# Patient Record
Sex: FEMALE
[De-identification: names, ages, dates, MRNs, and addresses within clinical notes are randomized per-mention and may not be internally consistent; named-entity substitution may affect disease eponyms.]

---

## 2021-04-14 DIAGNOSIS — Z23 ENCOUNTER FOR IMMUNIZATION: ICD-10-CM

## 2024-06-13 ENCOUNTER — CARE COORDINATION (OUTPATIENT)
Dept: OTHER | Facility: CLINIC | Age: 69
End: 2024-06-13

## 2024-06-13 RX ORDER — ASPIRIN 81 MG/1
81 TABLET, CHEWABLE ORAL DAILY
COMMUNITY
Start: 2024-02-26 | End: 2025-02-25

## 2024-06-13 RX ORDER — METOPROLOL SUCCINATE 25 MG/1
25 TABLET, EXTENDED RELEASE ORAL 2 TIMES DAILY
COMMUNITY
Start: 2016-06-19

## 2024-06-13 RX ORDER — FOLIC ACID 1 MG/1
1 TABLET ORAL DAILY
COMMUNITY
Start: 2021-02-22

## 2024-06-13 RX ORDER — ERGOCALCIFEROL 1.25 MG/1
50000 CAPSULE ORAL DAILY
COMMUNITY

## 2024-06-13 RX ORDER — ACETAMINOPHEN 160 MG
1 TABLET,DISINTEGRATING ORAL DAILY
COMMUNITY

## 2024-06-13 RX ORDER — CETIRIZINE HYDROCHLORIDE 5 MG/1
1 TABLET ORAL DAILY
COMMUNITY
Start: 2024-02-23

## 2024-06-13 RX ORDER — CLOPIDOGREL BISULFATE 75 MG/1
75 TABLET ORAL DAILY
COMMUNITY
Start: 2024-02-26 | End: 2025-02-25

## 2024-06-13 RX ORDER — FLUOXETINE HYDROCHLORIDE 40 MG/1
1 CAPSULE ORAL DAILY
COMMUNITY
Start: 2017-01-03

## 2024-06-13 RX ORDER — VITAMIN B COMPLEX
1 CAPSULE ORAL DAILY
COMMUNITY

## 2024-06-13 RX ORDER — DOXYCYCLINE HYCLATE 100 MG/1
100 CAPSULE ORAL EVERY 12 HOURS
COMMUNITY
Start: 2024-06-09 | End: 2024-06-16

## 2024-06-13 RX ORDER — CHLORDIAZEPOXIDE HYDROCHLORIDE 10 MG/1
10 CAPSULE, GELATIN COATED ORAL 3 TIMES DAILY PRN
COMMUNITY
Start: 2024-01-19

## 2024-06-13 RX ORDER — CYCLOBENZAPRINE HCL 10 MG
1 TABLET ORAL 3 TIMES DAILY PRN
COMMUNITY
Start: 2024-02-23

## 2024-06-13 NOTE — CARE COORDINATION
Patient  Readiness: Acceptance  Method: Explanation  Response: Verbalizes Understanding    Review Discharge Plan, taught by Fide Marmolejo RN at 6/13/2024  9:28 AM.  Learner: Patient  Readiness: Acceptance  Method: Explanation  Response: Verbalizes Understanding    Fall Prevention, taught by Fide Marmolejo RN at 6/13/2024  9:28 AM.  Learner: Patient  Readiness: Acceptance  Method: Explanation  Response: Verbalizes Understanding    Educate safety precautions, taught by Fide Marmolejo RN at 6/13/2024  9:28 AM.  Learner: Patient  Readiness: Acceptance  Method: Explanation  Response: Verbalizes Understanding    Educate home safety, taught by Fide Marmolejo RN at 6/13/2024  9:28 AM.  Learner: Patient  Readiness: Acceptance  Method: Explanation  Response: Verbalizes Understanding    Educate ambulation safety, taught by Fide Marmolejo RN at 6/13/2024  9:28 AM.  Learner: Patient  Readiness: Acceptance  Method: Explanation  Response: Verbalizes Understanding    Education Comments  No comments found.     ,    Goals Addressed                   This Visit's Progress     Conditions and Symptoms        I will schedule office visits, as directed by my provider.  I will keep my appointment or reschedule if I have to cancel.  I will notify my provider of any barriers to my plan of care.  I will notify my provider of any symptoms that indicate a worsening of my condition.    Barriers: stress  Plan for overcoming my barriers: work with ACM, providers  Confidence: 9/10  Anticipated Goal Completion Date: 7/13/24         Reduce Falls         I will reduce my risk of falls by the following:  use soft cast, wear appropriate footwear    Barriers: stress  Plan for overcoming my barriers: work with ACM, orthopedic, PCP  Confidence: 10/10  Anticipated Goal Completion Date: 7/13/24               PCP/Specialist follow up:   Follow-up Information       Follow up With Specialties Details Why Contact Info    [facility] [place]  Go on

## 2024-06-20 ENCOUNTER — CARE COORDINATION (OUTPATIENT)
Dept: OTHER | Facility: CLINIC | Age: 69
End: 2024-06-20

## 2024-06-20 RX ORDER — ACETAMINOPHEN 500 MG
500 TABLET ORAL EVERY 6 HOURS PRN
COMMUNITY

## 2024-06-20 NOTE — CARE COORDINATION
Ambulatory Care Coordination Note     2024 1:45 PM     Patient Current Location:  [place]     ACM contacted the patient by telephone. Verified name and  with patient as identifiers.         ACM: HAFSA JUNIOR RN     Challenges to be reviewed by the provider   Additional needs identified to be addressed with provider No  none               Method of communication with provider: none.    Care Summary Note: ACM follow up call to patient. Patient states that she went to ortho appointment, patient has a hairline fracture above her left ankle and was placed in a walking boot. Patient states she will need boot for 3-6 weeks and then an ankle brace. Patient has some discomfort from the boot, states she has had to adjust her stride. Denies any further falls. Patient states that swelling has gone down and she takes Tylenol for discomfort. ACM discussed elevation and ice if needed. Patient is calling today to schedule follow up appointment in 3 weeks.     Offered patient enrollment in the Remote Patient Monitoring (RPM) program for in-home monitoring: Patient is not eligible for RPM program because: affiliate provider.     Assessments Completed:   Care Coordination Interventions    Referral from Primary Care Provider: No  Suggested Interventions and Community Resources  Fall Risk Prevention: In Process  Other Services: Completed (Comment: 24 ortho follow up)       and   General Assessment    Do you have any symptoms that are causing concern?: Yes  Progression since Onset: Gradually Improving  Reported Symptoms: Other, Pain (Comment: has discomfort and swelling in left ankle due to fracture)          Medications Reviewed:   Completed during a previous call  and patient takes Tylenol prn for pain    Advance Care Planning:   Not on file; education provided, Health Care Decision maker updated, and   Primary Decision Maker: Ale Benitez - Child - [phone] ACM advised to take a copy of ACP to next appointment

## 2024-06-27 ENCOUNTER — CARE COORDINATION (OUTPATIENT)
Dept: OTHER | Facility: CLINIC | Age: 69
End: 2024-06-27

## 2024-06-27 NOTE — CARE COORDINATION
Ambulatory Care Coordination Note     6/27/2024 9:22 AM     Patient outreach attempt by this ACM today to perform care management follow up . ACM was unable to reach the patient by telephone today; left voice message requesting a return phone call to this ACM.     ACM: HAFSA JUNIOR RN         PCP/Specialist follow up:       Follow Up:   Plan for next AC outreach in approximately 2 weeks to complete:  - disease specific assessments  - SDOH assessments  - follow-up appointment with ortho .

## 2024-07-10 ENCOUNTER — CARE COORDINATION (OUTPATIENT)
Dept: OTHER | Facility: CLINIC | Age: 69
End: 2024-07-10

## 2024-07-10 RX ORDER — IBUPROFEN 200 MG
400 TABLET ORAL EVERY 6 HOURS PRN
COMMUNITY

## 2024-07-10 SDOH — HEALTH STABILITY: MENTAL HEALTH: HOW OFTEN DO YOU HAVE A DRINK CONTAINING ALCOHOL?: 2-3 TIMES A WEEK

## 2024-07-10 SDOH — HEALTH STABILITY: MENTAL HEALTH
STRESS IS WHEN SOMEONE FEELS TENSE, NERVOUS, ANXIOUS, OR CAN'T SLEEP AT NIGHT BECAUSE THEIR MIND IS TROUBLED. HOW STRESSED ARE YOU?: RATHER MUCH

## 2024-07-10 SDOH — SOCIAL STABILITY: SOCIAL NETWORK: ARE YOU MARRIED, WIDOWED, DIVORCED, SEPARATED, NEVER MARRIED, OR LIVING WITH A PARTNER?: WIDOWED

## 2024-07-10 SDOH — SOCIAL STABILITY: SOCIAL INSECURITY
WITHIN THE LAST YEAR, HAVE YOU BEEN KICKED, HIT, SLAPPED, OR OTHERWISE PHYSICALLY HURT BY YOUR PARTNER OR EX-PARTNER?: NO

## 2024-07-10 SDOH — SOCIAL STABILITY: SOCIAL INSECURITY: WITHIN THE LAST YEAR, HAVE YOU BEEN HUMILIATED OR EMOTIONALLY ABUSED IN OTHER WAYS BY YOUR PARTNER OR EX-PARTNER?: NO

## 2024-07-10 SDOH — SOCIAL STABILITY: SOCIAL INSECURITY: WITHIN THE LAST YEAR, HAVE YOU BEEN AFRAID OF YOUR PARTNER OR EX-PARTNER?: NO

## 2024-07-10 SDOH — SOCIAL STABILITY: SOCIAL NETWORK: HOW OFTEN DO YOU ATTEND CHURCH OR RELIGIOUS SERVICES?: NEVER

## 2024-07-10 SDOH — SOCIAL STABILITY: SOCIAL NETWORK
DO YOU BELONG TO ANY CLUBS OR ORGANIZATIONS SUCH AS CHURCH GROUPS UNIONS, FRATERNAL OR ATHLETIC GROUPS, OR SCHOOL GROUPS?: YES

## 2024-07-10 SDOH — ECONOMIC STABILITY: FOOD INSECURITY: WITHIN THE PAST 12 MONTHS, YOU WORRIED THAT YOUR FOOD WOULD RUN OUT BEFORE YOU GOT MONEY TO BUY MORE.: NEVER TRUE

## 2024-07-10 SDOH — ECONOMIC STABILITY: INCOME INSECURITY: HOW HARD IS IT FOR YOU TO PAY FOR THE VERY BASICS LIKE FOOD, HOUSING, MEDICAL CARE, AND HEATING?: NOT VERY HARD

## 2024-07-10 SDOH — HEALTH STABILITY: MENTAL HEALTH: HOW MANY STANDARD DRINKS CONTAINING ALCOHOL DO YOU HAVE ON A TYPICAL DAY?: 1 OR 2

## 2024-07-10 SDOH — ECONOMIC STABILITY: HOUSING INSECURITY
IN THE LAST 12 MONTHS, WAS THERE A TIME WHEN YOU DID NOT HAVE A STEADY PLACE TO SLEEP OR SLEPT IN A SHELTER (INCLUDING NOW)?: NO

## 2024-07-10 SDOH — SOCIAL STABILITY: SOCIAL INSECURITY
WITHIN THE LAST YEAR, HAVE TO BEEN RAPED OR FORCED TO HAVE ANY KIND OF SEXUAL ACTIVITY BY YOUR PARTNER OR EX-PARTNER?: NO

## 2024-07-10 SDOH — HEALTH STABILITY: PHYSICAL HEALTH: ON AVERAGE, HOW MANY MINUTES DO YOU ENGAGE IN EXERCISE AT THIS LEVEL?: 60 MIN

## 2024-07-10 SDOH — ECONOMIC STABILITY: TRANSPORTATION INSECURITY
IN THE PAST 12 MONTHS, HAS LACK OF TRANSPORTATION KEPT YOU FROM MEETINGS, WORK, OR FROM GETTING THINGS NEEDED FOR DAILY LIVING?: NO

## 2024-07-10 SDOH — ECONOMIC STABILITY: FOOD INSECURITY: WITHIN THE PAST 12 MONTHS, THE FOOD YOU BOUGHT JUST DIDN'T LAST AND YOU DIDN'T HAVE MONEY TO GET MORE.: NEVER TRUE

## 2024-07-10 SDOH — ECONOMIC STABILITY: INCOME INSECURITY: IN THE LAST 12 MONTHS, WAS THERE A TIME WHEN YOU WERE NOT ABLE TO PAY THE MORTGAGE OR RENT ON TIME?: NO

## 2024-07-10 SDOH — SOCIAL STABILITY: SOCIAL NETWORK
IN A TYPICAL WEEK, HOW MANY TIMES DO YOU TALK ON THE PHONE WITH FAMILY, FRIENDS, OR NEIGHBORS?: MORE THAN THREE TIMES A WEEK

## 2024-07-10 SDOH — SOCIAL STABILITY: SOCIAL NETWORK: HOW OFTEN DO YOU ATTENT MEETINGS OF THE CLUB OR ORGANIZATION YOU BELONG TO?: MORE THAN 4 TIMES PER YEAR

## 2024-07-10 SDOH — ECONOMIC STABILITY: HOUSING INSECURITY: IN THE LAST 12 MONTHS, HOW MANY PLACES HAVE YOU LIVED?: 2

## 2024-07-10 SDOH — SOCIAL STABILITY: SOCIAL NETWORK: HOW OFTEN DO YOU GET TOGETHER WITH FRIENDS OR RELATIVES?: THREE TIMES A WEEK

## 2024-07-10 SDOH — HEALTH STABILITY: PHYSICAL HEALTH: ON AVERAGE, HOW MANY DAYS PER WEEK DO YOU ENGAGE IN MODERATE TO STRENUOUS EXERCISE (LIKE A BRISK WALK)?: 7 DAYS

## 2024-07-10 SDOH — ECONOMIC STABILITY: TRANSPORTATION INSECURITY
IN THE PAST 12 MONTHS, HAS THE LACK OF TRANSPORTATION KEPT YOU FROM MEDICAL APPOINTMENTS OR FROM GETTING MEDICATIONS?: NO

## 2024-07-10 NOTE — CARE COORDINATION
Ambulatory Care Coordination Note     7/10/2024 4:33 PM     Patient requested information on primary care providers specializing in internal medicine. The following information was emailed per request to [email or web address] along with email consent form.    PCP/Internal Medicine Providers   [facility] Internal Medicine Providers- within 10 miles of [address] [place] [address]  [email or web address]?location%5Bdistance%5D%5Bfrom%5D=16.09&location%5Bvalue%9Z=[email or web address]&f%5B0%5D=accepting_new_patients%3A1&f%5B1%5D=lvpg_tier%3A10&f%5B2%5D=provider_type%3A1&f%5B3%5D=specialty%9B8605&sort_by=search_api_relevance&sort_order=DESC&keys=Internal%20Medicine    [facility] by location- the same providers as in the above link but listed by each location. These links also list other providers at that location, such as NPs and PAs.  [facility] Internal [facility]   [phone]  [email or web address]/[email or web address]#sid  [facility]    [phone]  [email or web address]/doctors#sid  [facility]   [phone]  [email or web address]/doctors#sid  [facility]   [phone]  [email or web address]icine[facility]/doctors#sid    Internal Medicine Providers that are aligned with [facility], but not employed by [facility]  [email or web address]?location%5Bdistance%5D%5Bfrom%5D=16.09&location%5Bvalue%6Y=75963&f%5B0%5D=accepting_new_patients%3A1&f%5B1%5D=lvpg_tier%3A20&f%5B2%5D=provider_type%3A1&f%5B3%5D=specialty%5T2348&sort_by=search_api_relevance&sort_order=DESC&keys=Internal%20Medicine    Internal Medicine Providers that have independent practices. They have privileges at [facility] but may also work with other

## 2024-07-10 NOTE — CARE COORDINATION
Ambulatory Care Coordination Note     7/10/2024 10:34 AM     Patient Current Location:  [place]     ACM contacted the patient by telephone. Verified name and  with patient as identifiers.         ACM: HAFSA JUNIOR RN     Challenges to be reviewed by the provider   Additional needs identified to be addressed with provider No  none               Method of communication with provider: none.    Care Summary Note: ACM follow up call to patient. Patient reports that she went to [facility] on 24 for increased left knee swelling and difficulty bending knee, [facility] referred patient to ED to rule out DVT. Ultrasound negative for DVT. Patient continues to utilize walking boot and was very active the day before which may have contributed to increased swelling. Patient stated that knee pain and swelling has decreased but she continues to have swelling in her left shin. Patient has a follow up visit scheduled with ortho tomorrow and will ask for xray of left shin. Patient states that she has not had any further falls. Patient remains very active with friends and family and is involved in various social groups. Patient stated she has increased stress at this time due to son being diagnosed recently with stomach and esophageal cancer and is undergoing treatment at this time. Patient also reports that she has chronic depression for over 50 years which has been managed very well. Patient speaks to a therapist every 2 weeks but is concerned that she may need her medication increased due to increased concern for her son. Therapist does not want to increase medication and referred to PCP to manage. Patient states her PCP is over an hour away and she is looking for a new PCP close to her, specifically an internist. Patient stated that she is having difficulty finding a PCP that is accepting new patients and that it can take up to 4 months to get an appointment. ACM discussed PCP services provided by [facility]

## 2024-07-16 ENCOUNTER — CARE COORDINATION (OUTPATIENT)
Dept: OTHER | Facility: CLINIC | Age: 69
End: 2024-07-16

## 2024-07-16 NOTE — CARE COORDINATION
Ambulatory Care Coordination Note     7/16/2024 2:09 PM     Patient outreach attempt by this ACM today to perform care management follow up . ACM was unable to reach the patient by telephone today; left voice message requesting a return phone call to this ACM.     ACM: HAFSA JUNIOR RN         PCP/Specialist follow up:       Follow Up:   Plan for next AC outreach in approximately 1 week to complete:  - follow-up appointment with ortho, finding new pcp .

## 2024-07-24 ENCOUNTER — CARE COORDINATION (OUTPATIENT)
Dept: OTHER | Facility: CLINIC | Age: 69
End: 2024-07-24

## 2024-07-24 NOTE — CARE COORDINATION
Ambulatory Care Coordination Note     7/24/2024 11:25 AM     Patient outreach attempt by this ACM today to perform care management follow up . ACM was unable to reach the patient by telephone today; left voice message requesting a return phone call to this ACM.     ACM: HAFSA JUNIOR RN       PCP/Specialist follow up:       Follow Up:   Plan for next ACM outreach in approximately 1 week to complete:  - follow-up appointment with ortho .

## 2024-08-01 ENCOUNTER — CARE COORDINATION (OUTPATIENT)
Dept: OTHER | Facility: CLINIC | Age: 69
End: 2024-08-01

## 2024-08-01 NOTE — CARE COORDINATION
Ambulatory Care Coordination Note     8/1/2024 11:18 AM     patient outreach attempt by this ACM today to perform care management follow up . ACM was unable to reach the patient by telephone today; left voice message requesting a return phone call to this ACM.     Patient closed (unable to reach patient) from the High Risk Care Management program on 8/1/2024.  No further Ambulatory Care Manager follow up scheduled.

## 2024-08-02 ENCOUNTER — CARE COORDINATION (OUTPATIENT)
Dept: OTHER | Facility: CLINIC | Age: 69
End: 2024-08-02

## 2024-08-02 NOTE — CARE COORDINATION
Understanding    Fall Prevention, taught by Fide Marmolejo RN at 8/2/2024 11:08 AM.  Learner: Patient  Readiness: Acceptance  Method: Explanation  Response: Verbalizes Understanding    Education Comments  No comments found.     ,    Goals Addressed                   This Visit's Progress     Conditions and Symptoms   On track     I will schedule office visits, as directed by my provider.  I will keep my appointment or reschedule if I have to cancel.  I will notify my provider of any barriers to my plan of care.  I will notify my provider of any symptoms that indicate a worsening of my condition.    Barriers: stress  Plan for overcoming my barriers: work with ACM, providers  Confidence: 9/10  Anticipated Goal Completion Date: 9/2/24 6/20/24 patient went to ortho appointment         Reduce Falls    On track     I will reduce my risk of falls by the following:  use soft cast, wear appropriate footwear    Barriers: stress  Plan for overcoming my barriers: work with ACM, orthopedic, PCP  Confidence: 10/10  Anticipated Goal Completion Date: 7/13/24 6/20/24 ortho changed cast to walking boot               PCP/Specialist follow up:       Follow Up:   Plan for next ACM outreach in approximately 1 week to complete:  - check in, review acp, upcoming appointments scheduled .   Patient  is agreeable to this plan.

## 2024-08-09 ENCOUNTER — CARE COORDINATION (OUTPATIENT)
Dept: OTHER | Facility: CLINIC | Age: 69
End: 2024-08-09

## 2024-08-09 NOTE — CARE COORDINATION
Ambulatory Care Coordination Note     8/9/2024 11:09 AM     ACM outreach attempt by this ACM today to perform care management follow up . ACM was unable to reach the patient by telephone today; left voice message requesting a return phone call to this ACM.     ACM: HAFSA JUNIOR RN       PCP/Specialist follow up:       Follow Up:   Plan for next AC outreach in approximately 1 week to complete:  - advance care planning  - follow-up appointment with neurology scheduled .

## 2024-08-16 ENCOUNTER — CARE COORDINATION (OUTPATIENT)
Dept: OTHER | Facility: CLINIC | Age: 69
End: 2024-08-16

## 2024-08-16 NOTE — CARE COORDINATION
Ambulatory Care Coordination Note     8/16/2024 11:59 AM     ACM outreach attempt by this ACM today to perform care management follow up . AC was unable to reach the patient by telephone today; left voice message requesting a return phone call to this ACM.     ACM: HAFSA JUNIOR RN         PCP/Specialist follow up:       Follow Up:   Plan for next AC outreach in approximately 1 week to complete:  - follow-up appointment with neurologist scheduled? .

## 2024-08-23 ENCOUNTER — CARE COORDINATION (OUTPATIENT)
Dept: OTHER | Facility: CLINIC | Age: 69
End: 2024-08-23

## 2024-08-23 NOTE — CARE COORDINATION
Ambulatory Care Coordination Note     8/23/2024 10:49 AM     ACM outreach attempt by this ACM today to perform care management follow up . ACM was unable to reach the patient by telephone today; left voice message requesting a return phone call to this ACM.     ACM: HAFSA JUNIOR RN       PCP/Specialist follow up:       Follow Up:   Plan for next ACM outreach in approximately 1 week to complete:  - follow-up appointment with neuro scheduled? .

## 2024-08-29 ENCOUNTER — CARE COORDINATION (OUTPATIENT)
Dept: OTHER | Facility: CLINIC | Age: 69
End: 2024-08-29

## 2024-08-29 NOTE — CARE COORDINATION
Ambulatory Care Coordination Note     8/29/2024 4:12 PM     Patient outreach attempt by this ACM today to perform care management follow up . ACM was unable to reach the patient by telephone today; left voice message requesting a return phone call to this ACM.     ACM: HFASA JUNIOR RN     Care Summary Note: Follow up call to patient, left voicemail indicating final call and requested to return call if further follow up is requested.    PCP/Specialist follow up:       Follow Up:   Plan for next ACM outreach in approximately 1-2 days  to complete:  Monitor for return call, close if no call .

## 2024-08-30 ENCOUNTER — CARE COORDINATION (OUTPATIENT)
Dept: OTHER | Facility: CLINIC | Age: 69
End: 2024-08-30

## 2024-08-30 NOTE — CARE COORDINATION
Ambulatory Care Coordination Note     8/30/2024 2:28 PM     ACM left message yesterday indicating final follow up call and requested return call with any needs. Nor return call from patient. ACM unable to reach patient x 4 attempts, will sign off.     Patient closed (unable to reach patient) from the High Risk Care Management program on 8/30/2024.  No further Ambulatory Care Manager follow up scheduled.

## 2025-03-27 ENCOUNTER — TELEPHONE (OUTPATIENT)
Dept: GASTROENTEROLOGY | Facility: AMBULARY SURGERY CENTER | Age: 70
End: 2025-03-27

## 2025-03-27 NOTE — TELEPHONE ENCOUNTER
03/27/25  Screened by: Madhuri Haas    Referring Provider     Pre- Screening:     There is no height or weight on file to calculate BMI.  5'3, 170 LBS, BMI 30.1  Has patient been referred for a routine screening Colonoscopy? yes  Is the patient between 45-75 years old? yes      Previous Colonoscopy yes   If yes:    Date: 10 YEARS    Facility:     Reason:         Does the patient want to see a Gastroenterologist prior to their procedure OR are they having any GI symptoms? no    Has the patient been hospitalized or had abdominal surgery in the past 6 months? no    Does the patient use supplemental oxygen? no    Does the patient take Coumadin, Lovenox, Plavix, Elliquis, Xarelto, or other blood thinning medication? no    Has the patient had a stroke, cardiac event, or stent placed in the past year? no    If patient is between 45yrs - 49yrs, please advise patient that we will have to confirm benefits & coverage with their insurance company for a routine screening colonoscopy.

## 2025-04-10 ENCOUNTER — TELEPHONE (OUTPATIENT)
Dept: GASTROENTEROLOGY | Facility: CLINIC | Age: 70
End: 2025-04-10

## 2025-04-17 ENCOUNTER — OFFICE VISIT (OUTPATIENT)
Dept: GASTROENTEROLOGY | Facility: CLINIC | Age: 70
End: 2025-04-17
Payer: MEDICARE

## 2025-04-17 VITALS
BODY MASS INDEX: 31.08 KG/M2 | HEIGHT: 63 IN | DIASTOLIC BLOOD PRESSURE: 70 MMHG | WEIGHT: 175.4 LBS | TEMPERATURE: 97.3 F | SYSTOLIC BLOOD PRESSURE: 116 MMHG

## 2025-04-17 DIAGNOSIS — Z80.0 FAMILY HISTORY OF COLON CANCER: ICD-10-CM

## 2025-04-17 DIAGNOSIS — Z12.11 SCREENING FOR COLON CANCER: Primary | ICD-10-CM

## 2025-04-17 DIAGNOSIS — R13.10 DYSPHAGIA, UNSPECIFIED TYPE: ICD-10-CM

## 2025-04-17 PROCEDURE — 99204 OFFICE O/P NEW MOD 45 MIN: CPT | Performed by: PHYSICIAN ASSISTANT

## 2025-04-17 PROCEDURE — G2211 COMPLEX E/M VISIT ADD ON: HCPCS | Performed by: PHYSICIAN ASSISTANT

## 2025-04-17 RX ORDER — METOPROLOL TARTRATE 25 MG/1
25 TABLET, FILM COATED ORAL EVERY 12 HOURS SCHEDULED
COMMUNITY

## 2025-04-17 RX ORDER — SODIUM CHLORIDE, SODIUM LACTATE, POTASSIUM CHLORIDE, CALCIUM CHLORIDE 600; 310; 30; 20 MG/100ML; MG/100ML; MG/100ML; MG/100ML
125 INJECTION, SOLUTION INTRAVENOUS CONTINUOUS
OUTPATIENT
Start: 2025-04-17

## 2025-04-17 RX ORDER — VITAMIN B COMPLEX
1 CAPSULE ORAL DAILY
COMMUNITY

## 2025-04-17 RX ORDER — FLUOXETINE HYDROCHLORIDE 40 MG/1
40 CAPSULE ORAL DAILY
COMMUNITY

## 2025-04-17 RX ORDER — ASPIRIN 81 MG/1
81 TABLET, CHEWABLE ORAL DAILY
COMMUNITY

## 2025-04-17 RX ORDER — FOLIC ACID 0.4 MG
400 TABLET ORAL DAILY
COMMUNITY

## 2025-04-17 RX ORDER — CHLORDIAZEPOXIDE HYDROCHLORIDE 10 MG/1
10 CAPSULE, GELATIN COATED ORAL DAILY PRN
COMMUNITY

## 2025-04-17 RX ORDER — ASCORBATE CALCIUM 500 MG
500 TABLET ORAL DAILY
COMMUNITY

## 2025-04-17 RX ORDER — ACETAMINOPHEN 500 MG
1 TABLET ORAL EVERY 6 HOURS PRN
COMMUNITY

## 2025-04-17 RX ORDER — CETIRIZINE HYDROCHLORIDE 5 MG/1
5 TABLET ORAL DAILY
COMMUNITY

## 2025-04-17 RX ORDER — OMEGA-3S/DHA/EPA/FISH OIL/D3 300MG-1000
50000 CAPSULE ORAL WEEKLY
COMMUNITY

## 2025-04-17 NOTE — PROGRESS NOTES
Name: Gisselle Morrell      : 1955      MRN: [id number]  Encounter Provider: Bianca Villafana PA-C  Encounter Date: 2025   Encounter department: [facility] [place]  :  Assessment & Plan  Screening for colon cancer  She is overdue for CRC screening, last colonoscopy 15 years ago. She does have family history of colon cancer in her father. I discussed informed consent with the patient. The risks/benefits/alternatives of the procedure were discussed with the patient. Risks included, but not limited to, infection, bleeding, perforation, injury to organs in the abdomen, missed lesion and incomplete procedure were discussed. Patient was agreeable. Gave instructions for GoLytely prep.      Orders:    Colonoscopy; Future    polyethylene glycol (GOLYTELY) 4000 mL solution; Take as directed by the office for colonoscopy.    Family history of colon cancer  See plan above    Orders:    Colonoscopy; Future    polyethylene glycol (GOLYTELY) 4000 mL solution; Take as directed by the office for colonoscopy.    Dysphagia, unspecified type  She reports difficulty swallowing solid foods and unfortunately, her son passed away from esophageal cancer recently. She has intermittent reflux which is controlled with PPI as needed. No abnormal weight loss or history of tobacco use. Last EGD was 15 years ago.    We will schedule EGD at the time of colonoscopy to evaluate for etiology of dysphagia.  Differential diagnosis includes erosive esophagitis, eosinophilic esophagitis, stricture, ring, web, malignancy, dysmotility.    Orders:    EGD; Future    Follow-up after the procedures    History of Present Illness   HPI  Gisselle Morrell is a 69 y.o. female with anxiety, depression, fibromyalgia, B12 deficiency, rheumatoid arthritis, meningioma status post craniotomy, SVT who presents to schedule EGD and colonoscopy.    History obtained from: patient    Patient states she had EGD and colonoscopy about  15 years ago so she is currently overdue. She denies history of colon polyps. Her father did have colon cancer in his 80s. She denies blood in the stool. Her bowel movements are very regular. She eats high-fiber diet. She denies abdominal pain. She reports sporadic, infrequent reflux for which she takes Prilosec as needed with relief. She does report some difficulty swallowing solid foods intermittently, so would like to have an EGD done at the same time as the colonoscopy. No abnormal weight loss.    Of note, her son was unfortunately diagnosed with esophageal and stomach cancer last year and passed away about 2 months ago.    She does not smoke tobacco.    *Of note, she was admitted to the hospital last year with stroke-like symptoms although she followed up with neurology and had multiple scans done which came back negative for stroke. The cause of her neurologic symptoms is still unclear but those symptoms have completely resolved.    Review of Systems   Constitutional:  Negative for chills and fever.   HENT:  Negative for ear pain and sore throat.    Eyes:  Negative for pain and visual disturbance.   Respiratory:  Negative for cough and shortness of breath.    Cardiovascular:  Negative for chest pain and palpitations.   Gastrointestinal:  Negative for abdominal pain and vomiting.   Genitourinary:  Negative for dysuria and hematuria.   Musculoskeletal:  Negative for arthralgias and back pain.   Skin:  Negative for color change and rash.   Neurological:  Negative for seizures and syncope.   All other systems reviewed and are negative.         Objective   There were no vitals taken for this visit.     Physical Exam  Vitals and nursing note reviewed.   Constitutional:       General: She is not in acute distress.     Appearance: She is well-developed.   HENT:      Head: Normocephalic and atraumatic.   Eyes:      Conjunctiva/sclera: Conjunctivae normal.   Cardiovascular:      Rate and Rhythm: Normal rate and regular  rhythm.      Heart sounds: No murmur heard.  Pulmonary:      Effort: Pulmonary effort is normal. No respiratory distress.      Breath sounds: Normal breath sounds.   Abdominal:      Palpations: Abdomen is soft.      Tenderness: There is no abdominal tenderness.   Musculoskeletal:         General: No swelling.      Cervical back: Neck supple.   Skin:     General: Skin is warm and dry.   Neurological:      Mental Status: She is alert.   Psychiatric:         Mood and Affect: Mood normal.

## 2025-04-17 NOTE — PATIENT INSTRUCTIONS
Scheduled date of EGD/colonoscopy (as of today):5/8/25  Physician performing EGD/colonoscopy: Dr. Shelley  Location of EGD/colonoscopy: [facility]  Desired bowel prep reviewed with patient: Golytely  Instructions reviewed with patient by: Mary  Clearances:  N/A

## 2025-04-28 ENCOUNTER — ANESTHESIA EVENT (OUTPATIENT)
Dept: ANESTHESIOLOGY | Facility: HOSPITAL | Age: 70
End: 2025-04-28

## 2025-04-28 ENCOUNTER — ANESTHESIA (OUTPATIENT)
Dept: ANESTHESIOLOGY | Facility: HOSPITAL | Age: 70
End: 2025-04-28

## 2025-05-08 ENCOUNTER — ANESTHESIA (OUTPATIENT)
Dept: GASTROENTEROLOGY | Facility: MEDICAL CENTER | Age: 70
End: 2025-05-08
Payer: MEDICARE

## 2025-05-08 ENCOUNTER — ANESTHESIA EVENT (OUTPATIENT)
Dept: GASTROENTEROLOGY | Facility: MEDICAL CENTER | Age: 70
End: 2025-05-08
Payer: MEDICARE

## 2025-05-08 ENCOUNTER — HOSPITAL ENCOUNTER (OUTPATIENT)
Dept: GASTROENTEROLOGY | Facility: MEDICAL CENTER | Age: 70
Setting detail: OUTPATIENT SURGERY
End: 2025-05-08
Attending: PHYSICIAN ASSISTANT
Payer: MEDICARE

## 2025-05-08 VITALS
TEMPERATURE: 97 F | BODY MASS INDEX: 31.01 KG/M2 | HEART RATE: 67 BPM | HEIGHT: 63 IN | DIASTOLIC BLOOD PRESSURE: 66 MMHG | OXYGEN SATURATION: 100 % | WEIGHT: 175 LBS | SYSTOLIC BLOOD PRESSURE: 113 MMHG | RESPIRATION RATE: 16 BRPM

## 2025-05-08 DIAGNOSIS — K29.40 CHRONIC ATROPHIC GASTRITIS WITHOUT BLEEDING: Primary | ICD-10-CM

## 2025-05-08 DIAGNOSIS — Z12.11 SCREENING FOR COLON CANCER: ICD-10-CM

## 2025-05-08 DIAGNOSIS — Z80.0 FAMILY HISTORY OF COLON CANCER: ICD-10-CM

## 2025-05-08 DIAGNOSIS — R13.10 DYSPHAGIA, UNSPECIFIED TYPE: ICD-10-CM

## 2025-05-08 PROCEDURE — 43239 EGD BIOPSY SINGLE/MULTIPLE: CPT | Performed by: INTERNAL MEDICINE

## 2025-05-08 PROCEDURE — 88305 TISSUE EXAM BY PATHOLOGIST: CPT | Performed by: PATHOLOGY

## 2025-05-08 PROCEDURE — 43251 EGD REMOVE LESION SNARE: CPT | Performed by: INTERNAL MEDICINE

## 2025-05-08 PROCEDURE — 45385 COLONOSCOPY W/LESION REMOVAL: CPT | Performed by: INTERNAL MEDICINE

## 2025-05-08 RX ORDER — SODIUM CHLORIDE, SODIUM LACTATE, POTASSIUM CHLORIDE, CALCIUM CHLORIDE 600; 310; 30; 20 MG/100ML; MG/100ML; MG/100ML; MG/100ML
125 INJECTION, SOLUTION INTRAVENOUS CONTINUOUS
Status: DISCONTINUED | OUTPATIENT
Start: 2025-05-08 | End: 2025-05-12 | Stop reason: HOSPADM

## 2025-05-08 RX ORDER — PROPOFOL 10 MG/ML
INJECTION, EMULSION INTRAVENOUS AS NEEDED
Status: DISCONTINUED | OUTPATIENT
Start: 2025-05-08 | End: 2025-05-08

## 2025-05-08 RX ORDER — LIDOCAINE HYDROCHLORIDE 20 MG/ML
INJECTION, SOLUTION EPIDURAL; INFILTRATION; INTRACAUDAL; PERINEURAL AS NEEDED
Status: DISCONTINUED | OUTPATIENT
Start: 2025-05-08 | End: 2025-05-08

## 2025-05-08 RX ORDER — SODIUM CHLORIDE, SODIUM LACTATE, POTASSIUM CHLORIDE, CALCIUM CHLORIDE 600; 310; 30; 20 MG/100ML; MG/100ML; MG/100ML; MG/100ML
20 INJECTION, SOLUTION INTRAVENOUS CONTINUOUS
Status: CANCELLED | OUTPATIENT
Start: 2025-05-08

## 2025-05-08 RX ORDER — SODIUM CHLORIDE, SODIUM LACTATE, POTASSIUM CHLORIDE, CALCIUM CHLORIDE 600; 310; 30; 20 MG/100ML; MG/100ML; MG/100ML; MG/100ML
20 INJECTION, SOLUTION INTRAVENOUS CONTINUOUS
Status: DISCONTINUED | OUTPATIENT
Start: 2025-05-08 | End: 2025-05-12 | Stop reason: HOSPADM

## 2025-05-08 RX ORDER — OMEPRAZOLE 40 MG/1
40 CAPSULE, DELAYED RELEASE ORAL DAILY
Qty: 30 CAPSULE | Refills: 5 | Status: SHIPPED | OUTPATIENT
Start: 2025-05-08 | End: 2025-11-04

## 2025-05-08 RX ADMIN — LIDOCAINE HYDROCHLORIDE 100 MG: 20 INJECTION, SOLUTION EPIDURAL; INFILTRATION; INTRACAUDAL at 10:14

## 2025-05-08 RX ADMIN — Medication 40 MG: at 10:41

## 2025-05-08 RX ADMIN — PROPOFOL 140 MG: 10 INJECTION, EMULSION INTRAVENOUS at 10:14

## 2025-05-08 RX ADMIN — SODIUM CHLORIDE, SODIUM LACTATE, POTASSIUM CHLORIDE, AND CALCIUM CHLORIDE 20 ML/HR: .6; .31; .03; .02 INJECTION, SOLUTION INTRAVENOUS at 09:41

## 2025-05-08 RX ADMIN — PROPOFOL 30 MG: 10 INJECTION, EMULSION INTRAVENOUS at 10:36

## 2025-05-08 RX ADMIN — PROPOFOL 30 MG: 10 INJECTION, EMULSION INTRAVENOUS at 10:38

## 2025-05-08 RX ADMIN — PROPOFOL 150 MCG/KG/MIN: 10 INJECTION, EMULSION INTRAVENOUS at 10:15

## 2025-05-08 RX ADMIN — PROPOFOL 30 MG: 10 INJECTION, EMULSION INTRAVENOUS at 10:24

## 2025-05-08 NOTE — ANESTHESIA POSTPROCEDURE EVALUATION
Post-Op Assessment Note    CV Status:  Stable    Pain management: adequate       Mental Status:  Alert and awake   Hydration Status:  Euvolemic   PONV Controlled:  Controlled   Airway Patency:  Patent     Post Op Vitals Reviewed: Yes    No anethesia notable event occurred.    Staff: CRNA           Last Filed PACU Vitals:  Vitals Value Taken Time   Temp     Pulse 79 05/08/25 1056   /62 05/08/25 1056   Resp 16 05/08/25 1056   SpO2 100 % 05/08/25 1056       Modified Dilma:     Vitals Value Taken Time   Activity 2 05/08/25 1056   Respiration 2 05/08/25 1056   Circulation 2 05/08/25 1056   Consciousness 1 05/08/25 1056   Oxygen Saturation 2 05/08/25 1056     Modified Dilma Score: 9

## 2025-05-08 NOTE — H&P
"History and Physical -  [facility]  Gisselle Morrell 70 y.o. female MRN: [id number]                  HPI: Gisselle Morrell is a 70 y.o. year old female who presents for colonoscopy for dysphagia and colonoscopy for family history of colon cancer.      REVIEW OF SYSTEMS: Per the HPI, and otherwise unremarkable.    Historical Information   Past Medical History:   Diagnosis Date    Anxiety     Rheumatoid arthritis (HCC)      Past Surgical History:   Procedure Laterality Date    BACK SURGERY      Lumbar    COLONOSCOPY  05:2015    CRANIOTOMY      2002    HERNIA REPAIR  04/15     Social History   Social History     Substance and Sexual Activity   Alcohol Use Yes    Alcohol/week: 2.0 standard drinks of alcohol    Types: 2 Glasses of wine per week     Social History     Substance and Sexual Activity   Drug Use Never     Social History     Tobacco Use   Smoking Status Never   Smokeless Tobacco Never     Family History   Problem Relation Age of Onset    Colon cancer Father     COPD Father     Heart disease Father     Hyperlipidemia Father     Hypertension Father     Esophageal cancer Other     Stomach cancer Other     Hypertension Mother     Stomach cancer Paternal Grandmother     Hypothyroidism Sister     Hypothyroidism Sister        Meds/Allergies     Not in a hospital admission.    Allergies   Allergen Reactions    Pregabalin Dizziness, Fatigue, Headache and Other (See Comments)     Extreme fatigue,migraine    Levofloxacin Myalgia    Tocilizumab Hypertension    Venlafaxine Delirium and Other (See Comments)     headache    Alprazolam Anxiety     Tolerates Librium    Meperidine Palpitations and Tachycardia    Penicillins Hives, Itching and Rash       Objective     Blood pressure 129/74, pulse 90, temperature (!) 97 °F (36.1 °C), temperature source Temporal, resp. rate 18, height 5' 3\" (1.6 m), weight 79.4 kg (175 lb), SpO2 99%, not currently breastfeeding.      PHYSICAL EXAM    Gen: NAD  CV: RRR  CHEST: " Clear  ABD: soft, NT/ND  EXT: no edema      ASSESSMENT/PLAN:  Gisselle Morrell is a 70 y.o. year old female who presents for colonoscopy for dysphagia and colonoscopy for family history of colon cancer. The patient is stable and optimized for the procedure, we reviewed risk and benefits. Risk include but not limited to infection, bleeding, perforation and missing a lesion.

## 2025-05-08 NOTE — ANESTHESIA PREPROCEDURE EVALUATION
Procedure:  EGD  COLONOSCOPY    Relevant Problems   ANESTHESIA (within normal limits)      CARDIO (within normal limits)      ENDO (within normal limits)      GI/HEPATIC (within normal limits)      /RENAL (within normal limits)      GYN (within normal limits)      HEMATOLOGY (within normal limits)      MUSCULOSKELETAL (within normal limits)      NEURO/PSYCH (within normal limits)      PULMONARY (within normal limits)        Physical Exam    Airway    Mallampati score: II         Dental       Cardiovascular  Cardiovascular exam normal    Pulmonary  Pulmonary exam normal     Other Findings  post-pubertal.      Anesthesia Plan  ASA Score- 2     Anesthesia Type- IV sedation with anesthesia with ASA Monitors.         Additional Monitors:     Airway Plan:            Plan Factors-Exercise tolerance (METS): >4 METS.    Chart reviewed.    Patient summary reviewed.    Patient is not a current smoker. Patient not instructed to abstain from smoking on day of procedure. Patient did not smoke on day of surgery.            Induction-     Postoperative Plan-         Informed Consent- Anesthetic plan and risks discussed with patient.  I personally reviewed this patient with the CRNA. Discussed and agreed on the Anesthesia Plan with the CRNA..      NPO Status:  No vitals data found for the desired time range.

## 2025-05-08 NOTE — ANESTHESIA POST-OP FOLLOW-UP NOTE
Patient: Gisselle Morrell  * No procedures listed *  Vitals:    05/08/25 1107   BP: 113/66   Pulse: 67   Resp: 16   Temp:    SpO2: 100%

## 2025-05-13 PROCEDURE — 88305 TISSUE EXAM BY PATHOLOGIST: CPT | Performed by: PATHOLOGY

## 2025-05-15 ENCOUNTER — RESULTS FOLLOW-UP (OUTPATIENT)
Dept: GASTROENTEROLOGY | Facility: CLINIC | Age: 70
End: 2025-05-15

## 2025-05-31 DIAGNOSIS — K29.40 CHRONIC ATROPHIC GASTRITIS WITHOUT BLEEDING: ICD-10-CM

## 2025-06-02 RX ORDER — OMEPRAZOLE 40 MG/1
40 CAPSULE, DELAYED RELEASE ORAL DAILY
Qty: 90 CAPSULE | Refills: 1 | Status: SHIPPED | OUTPATIENT
Start: 2025-06-02 | End: 2025-11-29

## 2025-07-22 ENCOUNTER — TELEPHONE (OUTPATIENT)
Dept: GASTROENTEROLOGY | Facility: CLINIC | Age: 70
End: 2025-07-22

## 2025-07-22 NOTE — TELEPHONE ENCOUNTER
I called & lvm for the patient that we have to reschedule their office appointment w/ Bianca Tapiaffer on 7/31 because she's going on maternity leave sooner than expected. I asked the patient to call us back to be rescheduled.

## 2025-07-24 ENCOUNTER — TELEPHONE (OUTPATIENT)
Dept: GASTROENTEROLOGY | Facility: CLINIC | Age: 70
End: 2025-07-24

## 2025-07-24 NOTE — TELEPHONE ENCOUNTER
Called pt to reschedule procedure f/u for 7/30 with Broderick however, pt stated she doesn't need f/u since physician already spoke with her and everything is good.